# Patient Record
Sex: MALE | URBAN - METROPOLITAN AREA
[De-identification: names, ages, dates, MRNs, and addresses within clinical notes are randomized per-mention and may not be internally consistent; named-entity substitution may affect disease eponyms.]

---

## 2020-10-28 ENCOUNTER — APPOINTMENT (RX ONLY)
Dept: URBAN - METROPOLITAN AREA CLINIC 56 | Facility: CLINIC | Age: 35
Setting detail: DERMATOLOGY
End: 2020-10-28

## 2020-10-28 VITALS — TEMPERATURE: 98.2 F

## 2020-10-28 DIAGNOSIS — L84 CORNS AND CALLOSITIES: ICD-10-CM

## 2020-10-28 PROBLEM — D23.72 OTHER BENIGN NEOPLASM OF SKIN OF LEFT LOWER LIMB, INCLUDING HIP: Status: ACTIVE | Noted: 2020-10-28

## 2020-10-28 PROBLEM — D48.5 NEOPLASM OF UNCERTAIN BEHAVIOR OF SKIN: Status: ACTIVE | Noted: 2020-10-28

## 2020-10-28 PROCEDURE — 99202 OFFICE O/P NEW SF 15 MIN: CPT | Mod: 25

## 2020-10-28 PROCEDURE — 11102 TANGNTL BX SKIN SINGLE LES: CPT

## 2020-10-28 PROCEDURE — ? COUNSELING

## 2020-10-28 PROCEDURE — ? ADDITIONAL NOTES

## 2020-10-28 PROCEDURE — ? BIOPSY BY SHAVE METHOD

## 2020-10-28 ASSESSMENT — LOCATION DETAILED DESCRIPTION DERM: LOCATION DETAILED: RIGHT LATERAL PLANTAR MIDFOOT

## 2020-10-28 ASSESSMENT — LOCATION ZONE DERM: LOCATION ZONE: FEET

## 2020-10-28 ASSESSMENT — LOCATION SIMPLE DESCRIPTION DERM: LOCATION SIMPLE: RIGHT PLANTAR SURFACE

## 2020-10-28 NOTE — PROCEDURE: ADDITIONAL NOTES
Detail Level: Simple
Additional Notes: Patient consent was obtained to proceed with the visit and recommended plan of care after discussion of all risks and benefits, including the risks of COVID-19 exposure.
Additional Notes: Recommend to see a podiatrist for removal.

## 2020-10-28 NOTE — PROCEDURE: MIPS QUALITY
Quality 431: Preventive Care And Screening: Unhealthy Alcohol Use - Screening: Patient screened for unhealthy alcohol use using a single question and scores 2 or greater episodes per year and brief intervention occurred
Detail Level: Detailed
Quality 402: Tobacco Use And Help With Quitting Among Adolescents: Patient screened for tobacco and is a smoker AND received Cessation Counseling

## 2022-12-13 ENCOUNTER — PREPPED CHART (OUTPATIENT)
Dept: URBAN - METROPOLITAN AREA CLINIC 49 | Facility: CLINIC | Age: 37
End: 2022-12-13

## 2023-01-25 ENCOUNTER — NEW PATIENT (OUTPATIENT)
Dept: URBAN - METROPOLITAN AREA CLINIC 50 | Facility: CLINIC | Age: 38
End: 2023-01-25

## 2023-01-25 DIAGNOSIS — H52.13: ICD-10-CM

## 2023-01-25 DIAGNOSIS — H16.223: ICD-10-CM

## 2023-01-25 PROCEDURE — 76514 ECHO EXAM OF EYE THICKNESS: CPT

## 2023-01-25 PROCEDURE — 99499LK REFRACTIVE CONSULT/LASIK

## 2023-01-25 RX ORDER — DIAZEPAM 5 MG/1: TABLET ORAL

## 2023-01-25 RX ORDER — LIFITEGRAST 50 MG/ML
1 SOLUTION/ DROPS OPHTHALMIC TWICE A DAY
Start: 2023-01-25

## 2023-01-25 RX ORDER — GLYCERIN 2 G/1000ML: 1 SOLUTION/ DROPS OPHTHALMIC

## 2023-01-25 ASSESSMENT — PACHYMETRY
OD_CT_UM: 571
OS_CT_UM: 571

## 2023-01-25 ASSESSMENT — KERATOMETRY
OD_AXISANGLE_DEGREES: 66.8
OS_AXISANGLE_DEGREES: 102.8
OS_AXISANGLE_DEGREES: 113
OD_K2POWER_DIOPTERS: 46.1
OS_K2POWER_DIOPTERS: 47.03
OS_K1POWER_DIOPTERS: 45.98
OD_K2POWER_DIOPTERS: 46.38
OS_K2POWER_DIOPTERS: 46.6
OD_AXISANGLE2_DEGREES: 156
OD_AXISANGLE2_DEGREES: 150
OD_AXISANGLE_DEGREES: 60
OS_K1POWER_DIOPTERS: 45.5
OS_AXISANGLE2_DEGREES: 12
OD_K1POWER_DIOPTERS: 45.6
OD_K1POWER_DIOPTERS: 45.77
OS_AXISANGLE2_DEGREES: 023

## 2023-01-25 ASSESSMENT — VISUAL ACUITY
OU_CC: 20/20
OD_SC: CF 3FT
OD_CC: 20/20
OU_SC: 20/400@16"
OU_SC: CF 3FT
OS_CC: 20/25
OS_SC: CF 3FT
OU_CC: J1+@16"

## 2023-01-25 ASSESSMENT — TONOMETRY
OS_IOP_MMHG: 17
OD_IOP_MMHG: 16
OS_IOP_MMHG: 19
OD_IOP_MMHG: 18

## 2023-12-15 ENCOUNTER — RX ONLY (OUTPATIENT)
Age: 38
Setting detail: RX ONLY
End: 2023-12-15

## 2023-12-15 ENCOUNTER — APPOINTMENT (RX ONLY)
Dept: URBAN - METROPOLITAN AREA CLINIC 57 | Facility: CLINIC | Age: 38
Setting detail: DERMATOLOGY
End: 2023-12-15

## 2023-12-15 DIAGNOSIS — B35.3 TINEA PEDIS: ICD-10-CM

## 2023-12-15 DIAGNOSIS — L30.1 DYSHIDROSIS [POMPHOLYX]: ICD-10-CM

## 2023-12-15 PROBLEM — L30.9 DERMATITIS, UNSPECIFIED: Status: ACTIVE | Noted: 2023-12-15

## 2023-12-15 PROCEDURE — ? PRESCRIPTION MEDICATION MANAGEMENT

## 2023-12-15 PROCEDURE — 99204 OFFICE O/P NEW MOD 45 MIN: CPT

## 2023-12-15 PROCEDURE — ? COUNSELING

## 2023-12-15 RX ORDER — CLOBETASOL PROPIONATE 0.5 MG/G
OINTMENT TOPICAL
Qty: 15 | Refills: 0 | Status: ERX | COMMUNITY
Start: 2023-12-15

## 2023-12-15 RX ORDER — MUPIROCIN 20 MG/G
OINTMENT TOPICAL
Qty: 22 | Refills: 1 | Status: CANCELLED
Stop reason: CLARIF

## 2023-12-15 ASSESSMENT — LOCATION SIMPLE DESCRIPTION DERM
LOCATION SIMPLE: PLANTAR SURFACE OF RIGHT 1ST TOE
LOCATION SIMPLE: LEFT INDEX FINGER
LOCATION SIMPLE: RIGHT MIDDLE FINGER

## 2023-12-15 ASSESSMENT — LOCATION DETAILED DESCRIPTION DERM
LOCATION DETAILED: RIGHT MIDDLE FINGERTIP
LOCATION DETAILED: RIGHT MEDIAL PLANTAR 1ST TOE
LOCATION DETAILED: LEFT INDEX FINGERTIP

## 2023-12-15 ASSESSMENT — LOCATION ZONE DERM
LOCATION ZONE: TOE
LOCATION ZONE: FINGER

## 2023-12-15 ASSESSMENT — BSA RASH: BSA RASH: 1

## 2023-12-15 ASSESSMENT — ITCH NUMERIC RATING SCALE: HOW SEVERE IS YOUR ITCHING?: 2

## 2023-12-15 NOTE — HPI: RASH
What Type Of Note Output Would You Prefer (Optional)?: Standard Output
Is The Patient Presenting As Previously Scheduled?: Yes
How Severe Is Your Rash?: mild
Is This A New Presentation, Or A Follow-Up?: Rash
Additional History: Patient is not having improvements with Econazole on his fingers.
Additional History: Patient is starting to have some improvements with econazole.

## 2023-12-15 NOTE — PROCEDURE: PRESCRIPTION MEDICATION MANAGEMENT
Detail Level: Zone
Initiate Treatment: Clobetasol ointment BID
Plan: Hx of tinea pedis- pt has been tx with Econazole cream x 1 month with positive improvement- applies medication with fingers. Pt started having rash on only 2 fingers a few weeks ago and started using EConazole on them without improvement. Not c/w tinea infection at this time. Denies pruritis. Denies pustules or vesicles. No hx of HSV. Will tx with strong topical steroid and reassess in 1-2 weeks. \\nPCR culture obtained to r/o infection. Sent to [facility].
Render In Strict Bullet Format?: No
Discontinue Regimen: Econazole cream
Continue Regimen: EConazole as rxed by podiatrist